# Patient Record
Sex: MALE | Race: ASIAN | Employment: FULL TIME | URBAN - METROPOLITAN AREA
[De-identification: names, ages, dates, MRNs, and addresses within clinical notes are randomized per-mention and may not be internally consistent; named-entity substitution may affect disease eponyms.]

---

## 2017-07-18 PROBLEM — D56.3 THALASSEMIA TRAIT: Status: ACTIVE | Noted: 2017-07-18

## 2017-10-17 PROBLEM — D64.9 ANEMIA: Status: ACTIVE | Noted: 2017-10-17

## 2017-10-17 PROBLEM — N52.8 OTHER MALE ERECTILE DYSFUNCTION: Status: ACTIVE | Noted: 2017-10-17

## 2019-07-02 PROBLEM — E55.9 VITAMIN D DEFICIENCY: Status: ACTIVE | Noted: 2019-07-02

## 2020-02-17 DIAGNOSIS — M81.0 OSTEOPOROSIS WITHOUT CURRENT PATHOLOGICAL FRACTURE, UNSPECIFIED OSTEOPOROSIS TYPE: ICD-10-CM

## 2020-02-17 PROCEDURE — 77080 DXA BONE DENSITY AXIAL: CPT

## 2022-03-18 PROBLEM — N52.8 OTHER MALE ERECTILE DYSFUNCTION: Status: ACTIVE | Noted: 2017-10-17

## 2022-03-18 PROBLEM — D56.3 THALASSEMIA TRAIT: Status: ACTIVE | Noted: 2017-07-18

## 2022-03-20 PROBLEM — E55.9 VITAMIN D DEFICIENCY: Status: ACTIVE | Noted: 2019-07-02

## 2022-06-01 DIAGNOSIS — E55.9 VITAMIN D DEFICIENCY: Primary | ICD-10-CM

## 2022-06-01 DIAGNOSIS — E29.1 TESTICULAR HYPERGONADOTROPIC HYPOGONADISM: ICD-10-CM

## 2022-07-17 DIAGNOSIS — E29.1 TESTICULAR HYPOFUNCTION: ICD-10-CM

## 2022-07-18 RX ORDER — TESTOSTERONE CYPIONATE 200 MG/ML
INJECTION INTRAMUSCULAR
Qty: 4 ML | OUTPATIENT
Start: 2022-07-18

## 2022-08-12 DIAGNOSIS — E55.9 VITAMIN D DEFICIENCY: ICD-10-CM

## 2022-08-12 DIAGNOSIS — E29.1 TESTICULAR HYPERGONADOTROPIC HYPOGONADISM: ICD-10-CM

## 2022-08-12 LAB
25(OH)D3 SERPL-MCNC: 25.2 NG/ML (ref 30–100)
ERYTHROCYTE [DISTWIDTH] IN BLOOD BY AUTOMATED COUNT: 22.6 % (ref 11.9–14.6)
HCT VFR BLD AUTO: 44 % (ref 41.1–50.3)
HGB BLD-MCNC: 11.9 G/DL (ref 13.6–17.2)
MCH RBC QN AUTO: 18.8 PG (ref 26.1–32.9)
MCHC RBC AUTO-ENTMCNC: 27 G/DL (ref 31.4–35)
MCV RBC AUTO: 69.6 FL (ref 79.6–97.8)
NRBC # BLD: 0 K/UL (ref 0–0.2)
PLATELET # BLD AUTO: 216 K/UL (ref 150–450)
PMV BLD AUTO: ABNORMAL FL (ref 9.4–12.3)
RBC # BLD AUTO: 6.32 M/UL (ref 4.23–5.6)
WBC # BLD AUTO: 6.5 K/UL (ref 4.3–11.1)

## 2022-08-13 LAB — TESTOST SERPL-MCNC: 511 NG/DL (ref 264–916)

## 2022-08-22 ENCOUNTER — OFFICE VISIT (OUTPATIENT)
Dept: ENDOCRINOLOGY | Age: 47
End: 2022-08-22
Payer: COMMERCIAL

## 2022-08-22 VITALS
DIASTOLIC BLOOD PRESSURE: 86 MMHG | WEIGHT: 136 LBS | SYSTOLIC BLOOD PRESSURE: 144 MMHG | HEART RATE: 104 BPM | BODY MASS INDEX: 21.3 KG/M2 | OXYGEN SATURATION: 95 %

## 2022-08-22 DIAGNOSIS — E55.9 VITAMIN D DEFICIENCY: ICD-10-CM

## 2022-08-22 DIAGNOSIS — D56.3 THALASSEMIA MINOR: ICD-10-CM

## 2022-08-22 DIAGNOSIS — E29.1 TESTICULAR HYPERGONADOTROPIC HYPOGONADISM: Primary | ICD-10-CM

## 2022-08-22 PROCEDURE — 99214 OFFICE O/P EST MOD 30 MIN: CPT | Performed by: INTERNAL MEDICINE

## 2022-08-22 RX ORDER — TESTOSTERONE CYPIONATE 200 MG/ML
150 INJECTION INTRAMUSCULAR
Qty: 12 ML | Refills: 1 | Status: SHIPPED | OUTPATIENT
Start: 2022-08-22 | End: 2023-02-18

## 2022-08-22 RX ORDER — SILDENAFIL 100 MG/1
100 TABLET, FILM COATED ORAL DAILY PRN
Qty: 7 TABLET | Refills: 5 | Status: SHIPPED | OUTPATIENT
Start: 2022-08-22

## 2022-08-22 RX ORDER — ERGOCALCIFEROL (VITAMIN D2) 1250 MCG
50000 CAPSULE ORAL
Qty: 26 CAPSULE | Refills: 3 | Status: SHIPPED | OUTPATIENT
Start: 2022-08-22

## 2022-08-22 NOTE — PROGRESS NOTES
Mendel Daily, MD, [facility]            Reason for visit: Follow-up of hypogonadism and vitamin D deficiency. ASSESSMENT AND PLAN:    1. Testicular hypergonadotropic hypogonadism  Testosterone drawn correction between injections is normal, suggesting appropriate replacement. Continue weekly testosterone as currently prescribed. - Testosterone Total Only, Male; Future  - CBC; Future  - PSA Screening; Future  - testosterone cypionate (DEPOTESTOTERONE CYPIONATE) 200 MG/ML injection; Inject 0.75 mLs into the muscle every 7 days for 180 days. Dispense: 12 mL; Refill: 1  - Needle, Disp, 18G X 1.25\" MISC; For drawing up testosterone every week  Dispense: 13 each; Refill: 3  - NEEDLE, DISP, 23 G 23G X 1-1/4\" MISC; For injecting testosterone every week  Dispense: 13 each; Refill: 3  - Syringe, Disposable, 1 ML MISC; For testosterone every week  Dispense: 13 each; Refill: 3  - sildenafil (VIAGRA) 100 MG tablet; Take 1 tablet by mouth daily as needed for Erectile Dysfunction  Dispense: 7 tablet; Refill: 5    2. Vitamin D deficiency  Vitamin D is low, but he indicates that he has been missing some vitamin D replacement doses. I encouraged improved compliance. - Vitamin D 25 Hydroxy; Future  - ergocalciferol (ERGOCALCIFEROL) 1.25 MG (65107 UT) capsule; Take 1 capsule by mouth Twice a Week  Dispense: 26 capsule; Refill: 3    3. Thalassemia minor      Follow-up and Dispositions    Return in about 6 months (around 2/22/2023). History of Present Illness:    HYPOGONADISM  Mr. Magnus Lyman is here for follow-up of suspected hypogonadism. This was diagnosed in 2014. He has previously been under the care of Dr. Adri Fournier ([address] [place] [address]) and Dr. Jose Law. Presentation/diagnosis: Diagnosed in 2014     Symptoms: See review of systems below     Treatment: He was started on weekly testosterone in November 2014.   This was stopped in December 2014 due to testicular shrinkage. In mid March 2017 he was started on Clomid and anastrazole; these were stopped in early May 2017. AndroGel 1% 3 packets daily was started in June 2017. This was increased to 4 packets daily in October 2017. Due to formulary considerations this was changed to testosterone cypionate 200 mg IM every 2 weeks in February 2020. His regimen was changed to 150 mg every week in July 2020. On his own, he increased his dose to 200 mg every week in ~October 2020. I asked him to reduce the dose back to 150 mg every week in January 2021. Side effects of treatment: Injected testosterone was previously associated with testicular shrinkage. Associated conditions: He experienced normal pubarche concomitant with his peers. He denies prior head or testicular injury.      Imaging:  none     Labs:  7/31/2015 at 1:28 PM:  Testosterone 252.  1/25/2016 at 3:20 PM: Testosterone 204.  1/17/2017 at 11:07 AM: LH 14.0, FSH 17.3, prolactin 11.9, TSH 1.630.  3/6/2017 at 1:55 PM: Testosterone 104, estradiol 42.2.  4/4/2017 at 9:20 AM: Testosterone 395, free testosterone 8.3 (reference 6.8-21.5), LH 18.7, FSH 26.5.  6/13/2017 at 9:33 AM: Testosterone 283, LH 12.2, FSH 19.0.  10/10/2017 at 9:12 AM: Testosterone 325, hemoglobin 10.8, karyotype 46,XY.  2/27/2018: 25 hydroxy vitamin D 18.3.  6/5/2018 at 9:21 AM: Testosterone 194, hemoglobin 10.5.  8/21/2018: Testosterone 522.  10/16/2018: Testosterone 969, hemoglobin 11.6, 25 hydroxy vitamin D 26.8.  6/25/2019: Testosterone 342, hemoglobin 11.1, 25 hydroxy vitamin D 27.1.  1/7/2020: Testosterone 442, hemoglobin 11.1, 25 hydroxy vitamin D 16.8.  6/29/2020 (2 days after testosterone injection): Testosterone 489, hemoglobin 13.0, 25 hydroxy vitamin D 20.7.  12/29/2020 (3 days after testosterone injection): Testosterone 1203, hemoglobin 12.9, 25 hydroxy vitamin D 18.6.  6/15/2021 (1 day after testosterone injection): Testosterone 713, hemoglobin 11.9, 25-hydroxy vitamin D 56.9.  1/7/2022 (2 days after testosterone injection): Testosterone 517, hemoglobin 11.3, 25-hydroxy vitamin D 51.9.  8/12/2022 (3 days after testosterone injection): Testosterone 511, hemoglobin 11.9, 25-hydroxy vitamin D 25.2. Review of Systems   Constitutional:  Positive for fatigue. Negative for unexpected weight change (intentionally lost 7 pounds in the last 14 months). BP (!) 144/86 (Site: Right Upper Arm, Position: Sitting)   Pulse (!) 104   Wt 136 lb (61.7 kg)   SpO2 95%   BMI 21.30 kg/m²   Wt Readings from Last 3 Encounters:   08/22/22 136 lb (61.7 kg)   06/25/21 143 lb (64.9 kg)       Physical Exam  Constitutional:       Appearance: Normal appearance. HENT:      Head: Normocephalic. Neck:      Thyroid: No thyroid mass or thyromegaly. Cardiovascular:      Rate and Rhythm: Normal rate and regular rhythm. Pulmonary:      Effort: Pulmonary effort is normal.      Breath sounds: Normal breath sounds. Neurological:      Mental Status: He is alert. Psychiatric:         Mood and Affect: Mood normal.         Behavior: Behavior normal.       Orders Placed This Encounter   Procedures    Testosterone Total Only, Male     Standing Status:   Future     Standing Expiration Date:   8/22/2023    CBC     Standing Status:   Future     Standing Expiration Date:   8/22/2023    PSA Screening     Standing Status:   Future     Standing Expiration Date:   8/22/2023    Vitamin D 25 Hydroxy     Standing Status:   Future     Standing Expiration Date:   8/22/2023         Current Outpatient Medications   Medication Sig Dispense Refill    testosterone cypionate (DEPOTESTOTERONE CYPIONATE) 200 MG/ML injection Inject 0.75 mLs into the muscle every 7 days for 180 days.  12 mL 1    ergocalciferol (ERGOCALCIFEROL) 1.25 MG (70597 UT) capsule Take 1 capsule by mouth Twice a Week 26 capsule 3    Needle, Disp, 18G X 1.25\" MISC For drawing up testosterone every week 13 each 3    NEEDLE, DISP, 23 G 23G X 1-1/4\" MISC For injecting testosterone every week 13 each 3    Syringe, Disposable, 1 ML MISC For testosterone every week 13 each 3    sildenafil (VIAGRA) 100 MG tablet Take 1 tablet by mouth daily as needed for Erectile Dysfunction 7 tablet 5    Lancets MISC Check blood sugar once daily. Cholecalciferol 50 MCG (2000 UT) CAPS Take 4,000 Units by mouth daily      emtricitabine-tenofovir alafenamide (DESCOVY) 200-25 MG TABS tablet TAKE 1 TABLET BY MOUTH EVERY DAY      Glucosamine 500 MG CAPS Take by mouth 3 times daily      loratadine (CLARITIN) 10 MG tablet Take 10 mg by mouth daily as needed      zolpidem (AMBIEN) 10 MG tablet Take 10 mg by mouth nightly as needed. No current facility-administered medications for this visit. Margo Singh MD, FACE      Portions of this note were generated with the assistance of voice recognition software. As such, some errors in transcription may be present.

## 2022-12-20 ENCOUNTER — TELEPHONE (OUTPATIENT)
Dept: ENDOCRINOLOGY | Age: 47
End: 2022-12-20

## 2023-02-20 NOTE — PROGRESS NOTES
ISIS Hill MD, [facility] ENDOCRINOLOGY   AND   THYROID NODULE CLINIC            Reason for visit: Follow-up of hypogonadism and vitamin D deficiency.      ASSESSMENT AND PLAN:    1. Testicular hypergonadotropic hypogonadism  Testosterone drawn intermediate between injections is normal, suggesting appropriate replacement.  Continue weekly testosterone as currently prescribed.  - Testosterone Total Only, Male; Future  - CBC; Future  - PSA Screening; Future  - testosterone cypionate (DEPOTESTOTERONE CYPIONATE) 200 MG/ML injection; Inject 0.75 mLs into the muscle every 7 days for 180 days.  Dispense: 12 mL; Refill: 1  - Needle, Disp, 18G X 1.25\" MISC; For drawing up testosterone every week  Dispense: 13 each; Refill: 3  - NEEDLE, DISP, 23 G 23G X 1-1/4\" MISC; For injecting testosterone every week  Dispense: 13 each; Refill: 3  - Syringe, Disposable, 1 ML MISC; For testosterone every week  Dispense: 13 each; Refill: 3  - sildenafil (VIAGRA) 100 MG tablet; Take 1 tablet by mouth daily as needed for Erectile Dysfunction  Dispense: 7 tablet; Refill: 5    2. Vitamin D deficiency  Vitamin D is low, but he indicates that he has been missing some vitamin D replacement doses.  I encouraged improved compliance.  - Vitamin D 25 Hydroxy; Future  - ergocalciferol (ERGOCALCIFEROL) 1.25 MG (24031 UT) capsule; Take 1 capsule by mouth Twice a Week  Dispense: 26 capsule; Refill: 3    3. Thalassemia minor      Follow-up and Dispositions    Return in about 6 months (around 8/21/2023).           History of Present Illness:    HYPOGONADISM  Mr. Stuart is here for follow-up of suspected hypogonadism.  This was diagnosed in 2014.  He has previously been under the care of Dr. Indra Trammell ([facility]) and Dr. Feliciano.       Presentation/diagnosis: Diagnosed in 2014     Symptoms: See review of systems below     Treatment: He was started on weekly testosterone in November 2014.  This was stopped in December 2014 due to testicular  shrinkage. In mid March 2017 he was started on Clomid and anastrazole; these were stopped in early May 2017. AndroGel 1% 3 packets daily was started in June 2017. This was increased to 4 packets daily in October 2017. Due to formulary considerations this was changed to testosterone cypionate 200 mg IM every 2 weeks in February 2020. His regimen was changed to 150 mg every week in July 2020. On his own, he increased his dose to 200 mg every week in ~October 2020. I asked him to reduce the dose back to 150 mg every week in January 2021. Side effects of treatment: Injected testosterone was previously associated with testicular shrinkage. Associated conditions: He experienced normal pubarche concomitant with his peers. He denies prior head or testicular injury.      Imaging: none     Labs:  7/31/2015 at 1:28 PM:  Testosterone 252.  1/25/2016 at 3:20 PM: Testosterone 204.  1/17/2017 at 11:07 AM: LH 14.0, FSH 17.3, prolactin 11.9, TSH 1.630.  3/6/2017 at 1:55 PM: Testosterone 104, estradiol 42.2.  4/4/2017 at 9:20 AM: Testosterone 395, free testosterone 8.3 (reference 6.8-21.5), LH 18.7, FSH 26.5.  6/13/2017 at 9:33 AM: Testosterone 283, LH 12.2, FSH 19.0.  10/10/2017 at 9:12 AM: Testosterone 325, hemoglobin 10.8, karyotype 46,XY.  2/27/2018: 25 hydroxy vitamin D 18.3.  6/5/2018 at 9:21 AM: Testosterone 194, hemoglobin 10.5.  8/21/2018: Testosterone 522.  10/16/2018: Testosterone 969, hemoglobin 11.6, 25 hydroxy vitamin D 26.8.  6/25/2019: Testosterone 342, hemoglobin 11.1, 25 hydroxy vitamin D 27.1.  1/7/2020: Testosterone 442, hemoglobin 11.1, 25 hydroxy vitamin D 16.8.  6/29/2020 (2 days after testosterone injection): Testosterone 489, hemoglobin 13.0, 25 hydroxy vitamin D 20.7.  12/29/2020 (3 days after testosterone injection): Testosterone 1203, hemoglobin 12.9, 25 hydroxy vitamin D 18.6.  6/15/2021 (1 day after testosterone injection): Testosterone 713, hemoglobin 11.9, 25-hydroxy vitamin D 56.9.  1/7/2022 (2 days after testosterone injection): Testosterone 517, hemoglobin 11.3, 25-hydroxy vitamin D 51.9.  8/12/2022 (3 days after testosterone injection): Testosterone 511, hemoglobin 11.9, 25-hydroxy vitamin D 25.2.  2/14/2023 (1 day after testosterone injection): Testosterone 996, hemoglobin 12.3, PSA 1.4, 25-hydroxy vitamin D 17.7    Review of Systems   Constitutional:  Positive for fatigue. Negative for unexpected weight change (stable last 6 months). Libido is normal.     /82   Pulse 92   Wt 137 lb (62.1 kg)   SpO2 96%   BMI 21.46 kg/m²   Wt Readings from Last 3 Encounters:   02/21/23 137 lb (62.1 kg)   08/22/22 136 lb (61.7 kg)   06/25/21 143 lb (64.9 kg)       Physical Exam  Constitutional:       Appearance: Normal appearance. HENT:      Head: Normocephalic. Neck:      Thyroid: No thyroid mass or thyromegaly. Cardiovascular:      Rate and Rhythm: Normal rate and regular rhythm. Pulmonary:      Effort: Pulmonary effort is normal.      Breath sounds: Normal breath sounds. Neurological:      Mental Status: He is alert.    Psychiatric:         Mood and Affect: Mood normal.         Behavior: Behavior normal.       Orders Placed This Encounter   Procedures    Testosterone Total Only, Male     Standing Status:   Future     Standing Expiration Date:   2/21/2024    CBC     Standing Status:   Future     Standing Expiration Date:   2/21/2024    Vitamin D 25 Hydroxy     Standing Status:   Future     Standing Expiration Date:   2/21/2024           Current Outpatient Medications   Medication Sig Dispense Refill    sildenafil (VIAGRA) 100 MG tablet Take 1 tablet by mouth daily as needed for Erectile Dysfunction 7 tablet 5    [START ON 2/23/2023] ergocalciferol (ERGOCALCIFEROL) 1.25 MG (26786 UT) capsule Take 1 capsule by mouth Twice a Week 26 capsule 3    Needle, Disp, 18G X 1.25\" MISC For drawing up testosterone every week 13 each 3    NEEDLE, DISP, 23 G 23G X 1-1/4\" MISC For injecting testosterone every week 13 each 3    testosterone cypionate (DEPOTESTOTERONE CYPIONATE) 200 MG/ML injection Inject 0.75 mLs into the muscle every 7 days for 180 days. 12 mL 1    Syringe, Disposable, 1 ML MISC For testosterone every week 13 each 3    Lancets MISC Check blood sugar once daily. Cholecalciferol 50 MCG (2000 UT) CAPS Take 4,000 Units by mouth daily      emtricitabine-tenofovir alafenamide (DESCOVY) 200-25 MG TABS tablet TAKE 1 TABLET BY MOUTH EVERY DAY      Glucosamine 500 MG CAPS Take by mouth 3 times daily      loratadine (CLARITIN) 10 MG tablet Take 10 mg by mouth daily as needed      zolpidem (AMBIEN) 10 MG tablet Take 10 mg by mouth nightly as needed. No current facility-administered medications for this visit. Hair Beal MD, FACE      Portions of this note were generated with the assistance of voice recognition software. As such, some errors in transcription may be present.

## 2023-02-21 ENCOUNTER — OFFICE VISIT (OUTPATIENT)
Dept: ENDOCRINOLOGY | Age: 48
End: 2023-02-21
Payer: COMMERCIAL

## 2023-02-21 VITALS
SYSTOLIC BLOOD PRESSURE: 124 MMHG | WEIGHT: 137 LBS | OXYGEN SATURATION: 96 % | DIASTOLIC BLOOD PRESSURE: 82 MMHG | BODY MASS INDEX: 21.46 KG/M2 | HEART RATE: 92 BPM

## 2023-02-21 DIAGNOSIS — E29.1 TESTICULAR HYPERGONADOTROPIC HYPOGONADISM: Primary | ICD-10-CM

## 2023-02-21 DIAGNOSIS — E55.9 VITAMIN D DEFICIENCY: ICD-10-CM

## 2023-02-21 DIAGNOSIS — D56.3 THALASSEMIA MINOR: ICD-10-CM

## 2023-02-21 PROCEDURE — 99214 OFFICE O/P EST MOD 30 MIN: CPT | Performed by: INTERNAL MEDICINE

## 2023-02-21 RX ORDER — ERGOCALCIFEROL 1.25 MG/1
50000 CAPSULE ORAL
Qty: 26 CAPSULE | Refills: 3 | Status: SHIPPED | OUTPATIENT
Start: 2023-02-23

## 2023-02-21 RX ORDER — TESTOSTERONE CYPIONATE 200 MG/ML
150 INJECTION INTRAMUSCULAR
Qty: 12 ML | Refills: 1 | Status: SHIPPED | OUTPATIENT
Start: 2023-02-21 | End: 2023-08-20

## 2023-02-21 RX ORDER — SILDENAFIL 100 MG/1
100 TABLET, FILM COATED ORAL DAILY PRN
Qty: 7 TABLET | Refills: 5 | Status: SHIPPED | OUTPATIENT
Start: 2023-02-21

## 2023-03-22 ENCOUNTER — TELEPHONE (OUTPATIENT)
Dept: ENDOCRINOLOGY | Age: 48
End: 2023-03-22

## 2023-03-22 NOTE — TELEPHONE ENCOUNTER
Outcome  Approvedtoday  Request Reference Number: [id number]. TESTOST CYP INJ 200MG/ML is approved through 03/22/2024. Your patient may now fill this prescription and it will be covered.

## 2023-08-14 DIAGNOSIS — E29.1 TESTICULAR HYPERGONADOTROPIC HYPOGONADISM: ICD-10-CM

## 2023-08-14 DIAGNOSIS — E55.9 VITAMIN D DEFICIENCY: ICD-10-CM

## 2023-08-14 LAB
25(OH)D3 SERPL-MCNC: 53.9 NG/ML (ref 30–100)
ERYTHROCYTE [DISTWIDTH] IN BLOOD BY AUTOMATED COUNT: 24.1 % (ref 11.9–14.6)
HCT VFR BLD AUTO: 44.3 % (ref 41.1–50.3)
HGB BLD-MCNC: 12.4 G/DL (ref 13.6–17.2)
MCH RBC QN AUTO: 18.4 PG (ref 26.1–32.9)
MCHC RBC AUTO-ENTMCNC: 28 G/DL (ref 31.4–35)
MCV RBC AUTO: 65.7 FL (ref 82–102)
NRBC # BLD: 0 K/UL (ref 0–0.2)
PLATELET # BLD AUTO: 269 K/UL (ref 150–450)
PMV BLD AUTO: ABNORMAL FL (ref 9.4–12.3)
RBC # BLD AUTO: 6.74 M/UL (ref 4.23–5.6)
WBC # BLD AUTO: 8.5 K/UL (ref 4.3–11.1)

## 2023-08-15 LAB — TESTOST SERPL-MCNC: 1073 NG/DL (ref 264–916)

## 2023-08-22 ENCOUNTER — OFFICE VISIT (OUTPATIENT)
Dept: ENDOCRINOLOGY | Age: 48
End: 2023-08-22
Payer: COMMERCIAL

## 2023-08-22 VITALS
BODY MASS INDEX: 21.3 KG/M2 | WEIGHT: 136 LBS | OXYGEN SATURATION: 96 % | SYSTOLIC BLOOD PRESSURE: 157 MMHG | HEART RATE: 102 BPM | DIASTOLIC BLOOD PRESSURE: 98 MMHG

## 2023-08-22 DIAGNOSIS — E55.9 VITAMIN D DEFICIENCY: ICD-10-CM

## 2023-08-22 DIAGNOSIS — E29.1 TESTICULAR HYPERGONADOTROPIC HYPOGONADISM: Primary | ICD-10-CM

## 2023-08-22 DIAGNOSIS — D56.3 THALASSEMIA MINOR: ICD-10-CM

## 2023-08-22 PROCEDURE — 99214 OFFICE O/P EST MOD 30 MIN: CPT | Performed by: INTERNAL MEDICINE

## 2023-08-22 RX ORDER — ERGOCALCIFEROL 1.25 MG/1
50000 CAPSULE ORAL
Qty: 26 CAPSULE | Refills: 3 | Status: SHIPPED | OUTPATIENT
Start: 2023-08-24

## 2023-08-22 RX ORDER — SILDENAFIL 100 MG/1
100 TABLET, FILM COATED ORAL DAILY PRN
Qty: 7 TABLET | Refills: 5 | Status: SHIPPED | OUTPATIENT
Start: 2023-08-22

## 2023-08-22 RX ORDER — TESTOSTERONE CYPIONATE 200 MG/ML
150 INJECTION, SOLUTION INTRAMUSCULAR
Qty: 12 ML | Refills: 1 | Status: SHIPPED | OUTPATIENT
Start: 2023-08-22 | End: 2024-02-18

## 2023-12-21 DIAGNOSIS — E29.1 TESTICULAR HYPERGONADOTROPIC HYPOGONADISM: ICD-10-CM

## 2024-01-18 RX ORDER — SILDENAFIL 100 MG/1
100 TABLET, FILM COATED ORAL DAILY PRN
Qty: 30 TABLET | Refills: 1 | OUTPATIENT
Start: 2024-01-18

## 2024-02-19 DIAGNOSIS — E55.9 VITAMIN D DEFICIENCY: ICD-10-CM

## 2024-02-19 DIAGNOSIS — E29.1 TESTICULAR HYPERGONADOTROPIC HYPOGONADISM: ICD-10-CM

## 2024-02-19 LAB
25(OH)D3 SERPL-MCNC: 26.9 NG/ML (ref 30–100)
ERYTHROCYTE [DISTWIDTH] IN BLOOD BY AUTOMATED COUNT: 24.6 % (ref 11.9–14.6)
HCT VFR BLD AUTO: 46 % (ref 41.1–50.3)
HGB BLD-MCNC: 12.6 G/DL (ref 13.6–17.2)
MCH RBC QN AUTO: 18.4 PG (ref 26.1–32.9)
MCHC RBC AUTO-ENTMCNC: 27.4 G/DL (ref 31.4–35)
MCV RBC AUTO: 67.2 FL (ref 82–102)
NRBC # BLD: 0 K/UL (ref 0–0.2)
PLATELET # BLD AUTO: 192 K/UL (ref 150–450)
PMV BLD AUTO: ABNORMAL FL (ref 9.4–12.3)
PSA SERPL-MCNC: 1 NG/ML
RBC # BLD AUTO: 6.85 M/UL (ref 4.23–5.6)
WBC # BLD AUTO: 7 K/UL (ref 4.3–11.1)

## 2024-02-21 LAB — TESTOST SERPL-MCNC: 615 NG/DL (ref 264–916)

## 2024-02-27 ENCOUNTER — OFFICE VISIT (OUTPATIENT)
Dept: ENDOCRINOLOGY | Age: 49
End: 2024-02-27
Payer: COMMERCIAL

## 2024-02-27 VITALS
OXYGEN SATURATION: 96 % | HEIGHT: 67 IN | BODY MASS INDEX: 21.03 KG/M2 | HEART RATE: 108 BPM | SYSTOLIC BLOOD PRESSURE: 138 MMHG | DIASTOLIC BLOOD PRESSURE: 84 MMHG | RESPIRATION RATE: 14 BRPM | WEIGHT: 134 LBS

## 2024-02-27 DIAGNOSIS — D56.3 THALASSEMIA MINOR: ICD-10-CM

## 2024-02-27 DIAGNOSIS — E55.9 VITAMIN D DEFICIENCY: ICD-10-CM

## 2024-02-27 DIAGNOSIS — E29.1 TESTICULAR HYPERGONADOTROPIC HYPOGONADISM: Primary | ICD-10-CM

## 2024-02-27 PROCEDURE — 99214 OFFICE O/P EST MOD 30 MIN: CPT | Performed by: INTERNAL MEDICINE

## 2024-02-27 RX ORDER — TESTOSTERONE CYPIONATE 200 MG/ML
150 INJECTION, SOLUTION INTRAMUSCULAR
Qty: 12 ML | Refills: 1 | Status: SHIPPED | OUTPATIENT
Start: 2024-02-27 | End: 2024-08-25

## 2024-02-27 RX ORDER — SILDENAFIL 100 MG/1
100 TABLET, FILM COATED ORAL DAILY PRN
Qty: 7 TABLET | Refills: 5 | Status: SHIPPED | OUTPATIENT
Start: 2024-02-27

## 2024-02-27 RX ORDER — ERGOCALCIFEROL 1.25 MG/1
50000 CAPSULE ORAL
Qty: 26 CAPSULE | Refills: 3 | Status: SHIPPED | OUTPATIENT
Start: 2024-02-29

## 2024-02-27 NOTE — PROGRESS NOTES
ISIS Hill MD, [facility] ENDOCRINOLOGY   AND   THYROID NODULE CLINIC            Reason for visit: Follow-up of hypogonadism and vitamin D deficiency.      ASSESSMENT AND PLAN:    1. Testicular hypergonadotropic hypogonadism  Testosterone drawn assisted between injections has been normal, suggesting appropriate replacement.  Continue weekly testosterone as currently prescribed.  - sildenafil (VIAGRA) 100 MG tablet; Take 1 tablet by mouth daily as needed for Erectile Dysfunction  Dispense: 7 tablet; Refill: 5  - Testosterone Total Only, Male; Future  - CBC; Future  - TSH with Reflex; Future  - testosterone cypionate (DEPOTESTOTERONE CYPIONATE) 200 MG/ML injection; Inject 0.75 mLs into the muscle every 7 days for 180 days.  Dispense: 12 mL; Refill: 1  - Syringe, Disposable, 1 ML MISC; For testosterone every week  Dispense: 13 each; Refill: 3  - NEEDLE, DISP, 23 G 23G X 1-1/4\" MISC; For injecting testosterone every week  Dispense: 13 each; Refill: 3  - Needle, Disp, 18G X 1.25\" MISC; For drawing up testosterone every week  Dispense: 13 each; Refill: 3    2. Vitamin D deficiency  Vitamin D is replete.  No changes.  - Vitamin D 25 Hydroxy; Future  - ergocalciferol (ERGOCALCIFEROL) 1.25 MG (85775 UT) capsule; Take 1 capsule by mouth Twice a Week  Dispense: 26 capsule; Refill: 3  - vitamin D (CHOLECALCIFEROL) 50 MCG (2000 UT) CAPS capsule; Take 2 capsules by mouth daily  Dispense: 60 capsule; Refill: 11    3. Thalassemia minor        Follow-up and Dispositions    Return in about 6 months (around 8/27/2024).             History of Present Illness:    HYPOGONADISM  Mr. Stuart is here for follow-up of suspected hypogonadism.  This was diagnosed in 2014.  He has previously been under the care of Dr. Indra Trammell ([facility]) and Dr. Feliciano.       Presentation/diagnosis: Diagnosed in 2014     Symptoms: See review of systems below     Treatment: He was started on weekly testosterone in November 2014.  This

## 2024-08-28 DIAGNOSIS — E55.9 VITAMIN D DEFICIENCY: ICD-10-CM

## 2024-08-28 DIAGNOSIS — E29.1 TESTICULAR HYPERGONADOTROPIC HYPOGONADISM: ICD-10-CM

## 2024-08-28 LAB
25(OH)D3 SERPL-MCNC: 18.8 NG/ML (ref 30–100)
ERYTHROCYTE [DISTWIDTH] IN BLOOD BY AUTOMATED COUNT: 23.4 % (ref 11.9–14.6)
HCT VFR BLD AUTO: 44.1 % (ref 41.1–50.3)
HGB BLD-MCNC: 12.5 G/DL (ref 13.6–17.2)
MCH RBC QN AUTO: 18.9 PG (ref 26.1–32.9)
MCHC RBC AUTO-ENTMCNC: 28.3 G/DL (ref 31.4–35)
MCV RBC AUTO: 66.8 FL (ref 82–102)
NRBC # BLD: 0 K/UL (ref 0–0.2)
PLATELET # BLD AUTO: 269 K/UL (ref 150–450)
PMV BLD AUTO: ABNORMAL FL (ref 9.4–12.3)
RBC # BLD AUTO: 6.6 M/UL (ref 4.23–5.6)
TSH W FREE THYROID IF ABNORMAL: 1.08 UIU/ML (ref 0.27–4.2)
WBC # BLD AUTO: 8.8 K/UL (ref 4.3–11.1)

## 2024-08-29 LAB — TESTOST SERPL-MCNC: 1275 NG/DL (ref 264–916)

## 2024-09-03 ENCOUNTER — OFFICE VISIT (OUTPATIENT)
Dept: ENDOCRINOLOGY | Age: 49
End: 2024-09-03
Payer: COMMERCIAL

## 2024-09-03 VITALS
DIASTOLIC BLOOD PRESSURE: 80 MMHG | WEIGHT: 134 LBS | BODY MASS INDEX: 20.99 KG/M2 | HEART RATE: 78 BPM | SYSTOLIC BLOOD PRESSURE: 130 MMHG

## 2024-09-03 DIAGNOSIS — E29.1 TESTICULAR HYPERGONADOTROPIC HYPOGONADISM: Primary | ICD-10-CM

## 2024-09-03 DIAGNOSIS — E55.9 VITAMIN D DEFICIENCY: ICD-10-CM

## 2024-09-03 DIAGNOSIS — R73.9 HYPERGLYCEMIA: ICD-10-CM

## 2024-09-03 DIAGNOSIS — D56.3 THALASSEMIA MINOR: ICD-10-CM

## 2024-09-03 PROCEDURE — 99214 OFFICE O/P EST MOD 30 MIN: CPT | Performed by: INTERNAL MEDICINE

## 2024-09-03 RX ORDER — ESZOPICLONE 3 MG/1
3 TABLET, FILM COATED ORAL NIGHTLY
COMMUNITY
Start: 2024-08-06

## 2024-09-03 RX ORDER — ERGOCALCIFEROL 1.25 MG/1
50000 CAPSULE ORAL
Qty: 26 CAPSULE | Refills: 3 | Status: SHIPPED | OUTPATIENT
Start: 2024-09-05

## 2024-09-03 RX ORDER — DOXYCYCLINE 100 MG/1
CAPSULE ORAL
COMMUNITY
Start: 2024-08-06

## 2024-09-03 RX ORDER — DEXTROAMPHETAMINE SACCHARATE, AMPHETAMINE ASPARTATE MONOHYDRATE, DEXTROAMPHETAMINE SULFATE AND AMPHETAMINE SULFATE 5; 5; 5; 5 MG/1; MG/1; MG/1; MG/1
CAPSULE, EXTENDED RELEASE ORAL
COMMUNITY
Start: 2024-08-19

## 2024-09-03 RX ORDER — TESTOSTERONE CYPIONATE 200 MG/ML
80 INJECTION, SOLUTION INTRAMUSCULAR
Qty: 12 ML | Refills: 1 | Status: SHIPPED | OUTPATIENT
Start: 2024-09-05 | End: 2025-03-04

## 2024-09-03 NOTE — PROGRESS NOTES
vitamin D 18.3.  6/5/2018 at 9:21 AM: Testosterone 194, hemoglobin 10.5.  8/21/2018: Testosterone 522.  10/16/2018: Testosterone 969, hemoglobin 11.6, 25 hydroxy vitamin D 26.8.  6/25/2019: Testosterone 342, hemoglobin 11.1, 25 hydroxy vitamin D 27.1.  1/7/2020: Testosterone 442, hemoglobin 11.1, 25 hydroxy vitamin D 16.8.  6/29/2020 (2 days after testosterone injection): Testosterone 489, hemoglobin 13.0, 25 hydroxy vitamin D 20.7.  12/29/2020 (3 days after testosterone injection): Testosterone 1203, hemoglobin 12.9, 25 hydroxy vitamin D 18.6.  6/15/2021 (1 day after testosterone injection): Testosterone 713, hemoglobin 11.9, 25-hydroxy vitamin D 56.9.  1/7/2022 (2 days after testosterone injection): Testosterone 517, hemoglobin 11.3, 25-hydroxy vitamin D 51.9.  8/12/2022 (3 days after testosterone injection): Testosterone 511, hemoglobin 11.9, 25-hydroxy vitamin D 25.2.  2/14/2023 (1 day after testosterone injection): Testosterone 996, hemoglobin 12.3, PSA 1.4, 25-hydroxy vitamin D 17.7.  8/14/2023 (1 day after testosterone injection): Testosterone 1073, hemoglobin 12.4, 25-hydroxy vitamin D 53.9.  2/19/2024 (6 days after testosterone injection): Testosterone 615, hemoglobin 12.6, PSA 1.0, 25-hydroxy vitamin D 26.9.  8/28/2024 (less than 1 day after testosterone injection): Testosterone 1275, hemoglobin 12.5, TSH 1.08, 25-hydroxy vitamin D 18.8.    Review of Systems   Constitutional:  Positive for fatigue (mild). Negative for unexpected weight change (stable last 6 months).        Libido is normal.   Psychiatric/Behavioral:  Positive for sleep disturbance.        /80 (Site: Left Upper Arm, Position: Sitting, Cuff Size: Large Adult)   Pulse 78   Wt 60.8 kg (134 lb)   BMI 20.99 kg/m²   Wt Readings from Last 3 Encounters:   09/03/24 60.8 kg (134 lb)   02/27/24 60.8 kg (134 lb)   08/22/23 61.7 kg (136 lb)       Physical Exam  Constitutional:       Appearance: Normal appearance.   HENT:      Head:

## 2025-03-04 DIAGNOSIS — E55.9 VITAMIN D DEFICIENCY: ICD-10-CM

## 2025-03-04 DIAGNOSIS — D56.3 THALASSEMIA MINOR: ICD-10-CM

## 2025-03-04 DIAGNOSIS — E29.1 TESTICULAR HYPERGONADOTROPIC HYPOGONADISM: ICD-10-CM

## 2025-03-04 LAB
25(OH)D3 SERPL-MCNC: 16.7 NG/ML (ref 30–100)
ERYTHROCYTE [DISTWIDTH] IN BLOOD BY AUTOMATED COUNT: 26.2 % (ref 11.9–14.6)
HCT VFR BLD AUTO: 46.5 % (ref 41.1–50.3)
HGB BLD-MCNC: 13.1 G/DL (ref 13.6–17.2)
MCH RBC QN AUTO: 18.2 PG (ref 26.1–32.9)
MCHC RBC AUTO-ENTMCNC: 28.2 G/DL (ref 31.4–35)
MCV RBC AUTO: 64.6 FL (ref 82–102)
NRBC # BLD: 0 K/UL (ref 0–0.2)
PLATELET # BLD AUTO: 215 K/UL (ref 150–450)
PMV BLD AUTO: ABNORMAL FL (ref 9.4–12.3)
PSA SERPL-MCNC: 0.9 NG/ML (ref 0–4)
RBC # BLD AUTO: 7.2 M/UL (ref 4.23–5.6)
WBC # BLD AUTO: 9.9 K/UL (ref 4.3–11.1)

## 2025-03-05 LAB — TESTOST SERPL-MCNC: >1500 NG/DL (ref 264–916)

## 2025-03-24 ENCOUNTER — OFFICE VISIT (OUTPATIENT)
Dept: ENDOCRINOLOGY | Age: 50
End: 2025-03-24
Payer: COMMERCIAL

## 2025-03-24 VITALS
DIASTOLIC BLOOD PRESSURE: 94 MMHG | BODY MASS INDEX: 21.44 KG/M2 | HEIGHT: 67 IN | SYSTOLIC BLOOD PRESSURE: 168 MMHG | OXYGEN SATURATION: 94 % | HEART RATE: 113 BPM | WEIGHT: 136.6 LBS

## 2025-03-24 DIAGNOSIS — E55.9 VITAMIN D DEFICIENCY: ICD-10-CM

## 2025-03-24 DIAGNOSIS — R73.9 HYPERGLYCEMIA: ICD-10-CM

## 2025-03-24 DIAGNOSIS — D56.3 THALASSEMIA MINOR: ICD-10-CM

## 2025-03-24 DIAGNOSIS — E29.1 TESTICULAR HYPERGONADOTROPIC HYPOGONADISM: Primary | ICD-10-CM

## 2025-03-24 PROCEDURE — 99214 OFFICE O/P EST MOD 30 MIN: CPT | Performed by: INTERNAL MEDICINE

## 2025-03-24 RX ORDER — ERGOCALCIFEROL 1.25 MG/1
50000 CAPSULE ORAL
Qty: 26 CAPSULE | Refills: 3 | Status: SHIPPED | OUTPATIENT
Start: 2025-03-24

## 2025-03-24 RX ORDER — TESTOSTERONE CYPIONATE 200 MG/ML
80 INJECTION, SOLUTION INTRAMUSCULAR
Qty: 12 ML | Refills: 1 | Status: SHIPPED | OUTPATIENT
Start: 2025-03-24 | End: 2025-09-20

## 2025-03-24 NOTE — PROGRESS NOTES
ISIS Hill MD, [facility] ENDOCRINOLOGY   AND   THYROID NODULE CLINIC            Reason for visit: Follow-up of hypogonadism and vitamin D deficiency.      ASSESSMENT AND PLAN:    1. Testicular hypergonadotropic hypogonadism  Rajiv Stuart has primary hypogonadism currently treated with weekly injected testosterone therapy.  His most recent testosterone level was significantly elevated, but it was checked about 12 hours after an injection.  No changes.  He will recheck testosterone 2 to 3 days after an injection prior to the next appointment with me.  - testosterone cypionate (DEPOTESTOTERONE CYPIONATE) 200 MG/ML injection; Inject 0.4 mLs into the muscle Twice a Week for 180 days. Max Daily Amount: 80 mg  Dispense: 12 mL; Refill: 1  - Syringe, Disposable, 1 ML MISC; For testosterone 2 times per week  Dispense: 26 each; Refill: 3  - NEEDLE, DISP, 23 G 23G X 1-1/4\" MISC; For injecting testosterone 2 times per week  Dispense: 13 each; Refill: 3  - Needle, Disp, 18G X 1.25\" MISC; For drawing up testosterone 2 times per week  Dispense: 13 each; Refill: 3    2. Vitamin D deficiency  I recommend that he resume vitamin D replacement.  - ergocalciferol (ERGOCALCIFEROL) 1.25 MG (66898 UT) capsule; Take 1 capsule by mouth Twice a Week  Dispense: 26 capsule; Refill: 3  - Vitamin D 25 Hydroxy; Future    3. Thalassemia minor  - CBC; Future    4. Hyperglycemia  Defer to Dr. Dao.        Follow-up and Dispositions    Return in about 6 months (around 9/24/2025).                 History of Present Illness:    HYPOGONADISM  Mr. Stuart is here for follow-up of suspected hypogonadism.  This was diagnosed in 2014.  He has previously been under the care of Dr. Indra Trammell ([facility]) and Dr. Feliciano.       Presentation/diagnosis: Diagnosed in 2014     Symptoms: See review of systems below     Treatment: He was started on weekly testosterone in November 2014.  This was stopped in December 2014 due to testicular

## 2025-07-02 DIAGNOSIS — E29.1 TESTICULAR HYPERGONADOTROPIC HYPOGONADISM: ICD-10-CM

## 2025-07-02 RX ORDER — TESTOSTERONE CYPIONATE 200 MG/ML
80 INJECTION, SOLUTION INTRAMUSCULAR
Qty: 12 ML | Refills: 1 | Status: SHIPPED | OUTPATIENT
Start: 2025-07-03 | End: 2025-12-30